# Patient Record
(demographics unavailable — no encounter records)

---

## 2024-10-16 NOTE — HISTORY OF PRESENT ILLNESS
[New - [facility] Care] : a new patient visit to establish care [Mother] : mother [FreeTextEntry6] : Hi is followed by peds. endocrinology due to growth concerns. All labs WNL, Bone age WNL. Father's growth history was similar. Last visit was 6 months ago, will continue to monitor at this time. Mother will get records from Endocrinologist.

## 2024-10-16 NOTE — DEVELOPMENTAL MILESTONES
[Eats healthy meals and snacks] : eats healthy meals and snacks [Participates in an after-school activity] : participates in an after-school activity [Has friends] : has friends [Is vigorously active for 1 hour a day] : is vigorously active for 1 hour a day [Has a caring/supportive family] : has a caring/supportive family [Is doing well in school] : is doing well in school [Is getting chances to make own decisions] : is getting chances to make own decisions [Feels good about self] : feels good about self [Does an activity really well; describe:  ____] : does an activity really well; describe: [unfilled]

## 2024-10-16 NOTE — DISCUSSION/SUMMARY
[FreeTextEntry1] : 10 yo M here for preventive visit to establish care No concerns about growth or development  Due for Flu vaccine today. After discussing risks/ benefits, parent in agreement with administration. VIS given. Recommend warm/cold compress for any post vaccine pain, redness or swelling. Pain meds as needed.  Return PRN. Return for well care visit.

## 2024-11-27 NOTE — DISCUSSION/SUMMARY
[FreeTextEntry1] : Contact dermatitis due to sitting in urine during the night Recommend: -Keep area clean and dry. -Apply hydrocortisone 1% to affected area twice a day for at least 5 days. -Oral antihistamines such as diphenhydramine (Benadryl) can also relieve itching. -Avoiding the irritant or allergen should allow the rash to clear in two to four weeks. -Protect the skin with an emollient ointment. -Avoid using baby-wipes to clean rather wash up with gentle soap and water.  Has primary nocturnal enuresis.  Discussed interventions with Mother and next thing she can try is enuresis alarm that he wears in his underwear. Also encouraged her to discontinue use of pull ups at night because that can further worsen the issue as it acts as a security blanket. Discussed the possibility of starting medication for enuresis, if no improvement but mother would like to hold off for now. Call the office or return if symptoms persist or worsen.

## 2024-11-27 NOTE — HISTORY OF PRESENT ILLNESS
[de-identified] : Rash on private area [FreeTextEntry6] : Recent development of rash on buttocks and anal area due to still wetting the bed a couple of times a week. Patient just got out of pull-ups and has been training without them, has accidents 2-3 times a week and sits in the urine overnight. Uses baby wipes to wipe buttock area and its irritated. Used diaper rash cream with good results.  Primary nocturnal enuresis discussed with mother. Father had problem with this as well as a child. Mother has tried to do away with pull ups and see if it would motivate him to get up but he just sleeps in the urine. She has set alarms to wake up every few hours to catch him before he urinates but it has been different each night. He is completely dry during the day without accidents.

## 2024-11-27 NOTE — PHYSICAL EXAM
[NL] : no acute distress, alert [Hemant: ____] : Hemant [unfilled] [Normal external genitalia] : normal external genitalia [Circumcised] : circumcised [de-identified] : Erythematous, maculopapular eruption on buttocks, wrapping around waist and lateral buttocks area, mild anal erythema, mild itching

## 2025-04-16 NOTE — PHYSICAL EXAM

## 2025-04-16 NOTE — HISTORY OF PRESENT ILLNESS
See me for annual exam in 6 months.    [Mother] : mother [Yes] : Patient goes to dentist yearly [Toothpaste] : Primary Fluoride Source: Toothpaste [Eats meals with family] : eats meals with family [Has family members/adults to turn to for help] : has family members/adults to turn to for help [Is permitted and is able to make independent decisions] : Is permitted and is able to make independent decisions [Normal Performance] : normal performance [Normal Behavior/Attention] : normal behavior/attention [Normal Homework] : normal homework [Eats regular meals including adequate fruits and vegetables] : eats regular meals including adequate fruits and vegetables [Drinks non-sweetened liquids] : drinks non-sweetened liquids  [Calcium source] : calcium source [Has friends] : has friends [At least 1 hour of physical activity a day] : at least 1 hour of physical activity a day [Screen time (except homework) less than 2 hours a day] : screen time (except homework) less than 2 hours a day [Has interests/participates in community activities/volunteers] : has interests/participates in community activities/volunteers. [Uses safety belts/safety equipment] : uses safety belts/safety equipment  [Has peer relationships free of violence] : has peer relationships free of violence [No] : Patient has not had sexual intercourse [Has ways to cope with stress] : has ways to cope with stress [Displays self-confidence] : displays self-confidence [With Teen] : teen [NO] : No [Grade: ____] : Grade: [unfilled] [Sleep Concerns] : no sleep concerns [Has concerns about body or appearance] : does not have concerns about body or appearance [Uses electronic nicotine delivery system] : does not use electronic nicotine delivery system [Exposure to electronic nicotine delivery system] : no exposure to electronic nicotine delivery system [Uses tobacco] : does not use tobacco [Exposure to tobacco] : no exposure to tobacco [Uses drugs] : does not use drugs  [Exposure to drugs] : no exposure to drugs [Drinks alcohol] : does not drink alcohol [Exposure to alcohol] : no exposure to alcohol [Impaired/distracted driving] : no impaired/distracted driving [Has problems with sleep] : does not have problems with sleep [Gets depressed, anxious, or irritable/has mood swings] : does not get depressed, anxious, or irritable/has mood swings [Has thought about hurting self or considered suicide] : has not thought about hurting self or considered suicide [FreeTextEntry7] : Hi was seen by peds. endocrinology due to growth concerns. All labs WNL, Bone age 8/23/23- WNL. Father's growth history was similar. [de-identified] : non [de-identified] : due for Tdap [FreeTextEntry3] : basketball

## 2025-04-16 NOTE — DISCUSSION/SUMMARY
[Normal Development] : development  [No Elimination Concerns] : elimination [Continue Regimen] : feeding [No Skin Concerns] : skin [Normal Sleep Pattern] : sleep [None] : no medical problems [Anticipatory Guidance Given] : Anticipatory guidance addressed as per the history of present illness section [Physical Growth and Development] : physical growth and development [Social and Academic Competence] : social and academic competence [Emotional Well-Being] : emotional well-being [Risk Reduction] : risk reduction [Violence and Injury Prevention] : violence and injury prevention [No Medications] : ~He/She~ is not on any medications [Patient] : patient [Parent/Guardian] : Parent/Guardian [Full Activity without restrictions including Physical Education & Athletics] : Full Activity without restrictions including Physical Education & Athletics [] : The components of the vaccine(s) to be administered today are listed in the plan of care. The disease(s) for which the vaccine(s) are intended to prevent and the risks have been discussed with the caretaker.  The risks are also included in the appropriate vaccination information statements which have been provided to the patient's caregiver.  The caregiver has given consent to vaccinate. [Tdap] : diptheria, tetanus and pertussis [Poor Height Growth] : poor height growth [BMI ___] : body mass index of [unfilled] [FreeTextEntry1] : 11 year old patient presenting for well child visit.  Poor height growth (7%)- was seen by peds endocrinologist, bone age/labs-wnl No underlying medical cause- will continue to monitor at this time. BMI increased from 16% to 36 % within one year.   Anticipatory guidance discussed with patient and mother regarding - good sleep hygiene for age (8-10 hours per night and no screens while in bed) - dental hygiene (brushing teeth 2x/day and yearly dentist visits) - well rounded/balanced diet and eating meals with family - maintaining an active lifestyle with at least 30-60 minutes of physical activity daily - emotional well being (dealing with stress and anxiety)  Due for Tdap vaccine today. After discussing risks/ benefits, parent in agreement with administration. VIS given. Recommend warm/cold compress for any post vaccine pain, redness or swelling. Pain meds as needed. Blood work to be performed- lab slip given.  Counseled the patient on - injury prevention (seatbelt, helmets, protective gear) - limiting screen time to <2 hours/day - always being supervised during outdoor activity - safe and violence free relationships with peers  Patient will return for next well child visit in 1 year.